# Patient Record
Sex: MALE | HISPANIC OR LATINO | Employment: FULL TIME | URBAN - METROPOLITAN AREA
[De-identification: names, ages, dates, MRNs, and addresses within clinical notes are randomized per-mention and may not be internally consistent; named-entity substitution may affect disease eponyms.]

---

## 2024-04-06 ENCOUNTER — HOSPITAL ENCOUNTER (EMERGENCY)
Facility: CLINIC | Age: 49
Discharge: HOME | End: 2024-04-07
Attending: EMERGENCY MEDICINE | Admitting: EMERGENCY MEDICINE

## 2024-04-06 ENCOUNTER — APPOINTMENT (OUTPATIENT)
Dept: GENERAL RADIOLOGY | Facility: CLINIC | Age: 49
End: 2024-04-06
Attending: EMERGENCY MEDICINE

## 2024-04-06 ENCOUNTER — APPOINTMENT (OUTPATIENT)
Dept: CT IMAGING | Facility: CLINIC | Age: 49
End: 2024-04-06
Attending: EMERGENCY MEDICINE

## 2024-04-06 VITALS
WEIGHT: 210 LBS | TEMPERATURE: 97.1 F | BODY MASS INDEX: 34.99 KG/M2 | OXYGEN SATURATION: 97 % | HEART RATE: 72 BPM | RESPIRATION RATE: 16 BRPM | SYSTOLIC BLOOD PRESSURE: 145 MMHG | HEIGHT: 65 IN | DIASTOLIC BLOOD PRESSURE: 80 MMHG

## 2024-04-06 DIAGNOSIS — T07.XXXA MULTIPLE CONTUSIONS: ICD-10-CM

## 2024-04-06 DIAGNOSIS — I49.1 PAC (PREMATURE ATRIAL CONTRACTION): ICD-10-CM

## 2024-04-06 LAB
ATRIAL RATE - MUSE: 78 BPM
DIASTOLIC BLOOD PRESSURE - MUSE: NORMAL MMHG
INTERPRETATION ECG - MUSE: NORMAL
P AXIS - MUSE: NORMAL DEGREES
PR INTERVAL - MUSE: 152 MS
QRS DURATION - MUSE: 86 MS
QT - MUSE: 376 MS
QTC - MUSE: 428 MS
R AXIS - MUSE: 46 DEGREES
SYSTOLIC BLOOD PRESSURE - MUSE: NORMAL MMHG
T AXIS - MUSE: 8 DEGREES
VENTRICULAR RATE- MUSE: 78 BPM

## 2024-04-06 PROCEDURE — 73080 X-RAY EXAM OF ELBOW: CPT | Mod: LT

## 2024-04-06 PROCEDURE — 73562 X-RAY EXAM OF KNEE 3: CPT | Mod: LT

## 2024-04-06 PROCEDURE — 93005 ELECTROCARDIOGRAM TRACING: CPT

## 2024-04-06 PROCEDURE — 73502 X-RAY EXAM HIP UNI 2-3 VIEWS: CPT

## 2024-04-06 PROCEDURE — 250N000013 HC RX MED GY IP 250 OP 250 PS 637: Performed by: EMERGENCY MEDICINE

## 2024-04-06 PROCEDURE — 72125 CT NECK SPINE W/O DYE: CPT

## 2024-04-06 PROCEDURE — 99285 EMERGENCY DEPT VISIT HI MDM: CPT | Mod: 25

## 2024-04-06 RX ORDER — ACETAMINOPHEN 500 MG
1000 TABLET ORAL ONCE
Status: COMPLETED | OUTPATIENT
Start: 2024-04-06 | End: 2024-04-06

## 2024-04-06 RX ORDER — IBUPROFEN 600 MG/1
600 TABLET, FILM COATED ORAL ONCE
Status: COMPLETED | OUTPATIENT
Start: 2024-04-06 | End: 2024-04-06

## 2024-04-06 RX ADMIN — IBUPROFEN 600 MG: 600 TABLET ORAL at 22:48

## 2024-04-06 RX ADMIN — ACETAMINOPHEN 1000 MG: 500 TABLET ORAL at 22:48

## 2024-04-06 ASSESSMENT — COLUMBIA-SUICIDE SEVERITY RATING SCALE - C-SSRS
2. HAVE YOU ACTUALLY HAD ANY THOUGHTS OF KILLING YOURSELF IN THE PAST MONTH?: NO
6. HAVE YOU EVER DONE ANYTHING, STARTED TO DO ANYTHING, OR PREPARED TO DO ANYTHING TO END YOUR LIFE?: NO
1. IN THE PAST MONTH, HAVE YOU WISHED YOU WERE DEAD OR WISHED YOU COULD GO TO SLEEP AND NOT WAKE UP?: NO

## 2024-04-06 ASSESSMENT — ACTIVITIES OF DAILY LIVING (ADL): ADLS_ACUITY_SCORE: 33

## 2024-04-07 LAB
ANION GAP SERPL CALCULATED.3IONS-SCNC: 13 MMOL/L (ref 7–15)
BASOPHILS # BLD AUTO: 0 10E3/UL (ref 0–0.2)
BASOPHILS NFR BLD AUTO: 0 %
BUN SERPL-MCNC: 17.7 MG/DL (ref 6–20)
CALCIUM SERPL-MCNC: 8.6 MG/DL (ref 8.6–10)
CHLORIDE SERPL-SCNC: 101 MMOL/L (ref 98–107)
CREAT SERPL-MCNC: 0.77 MG/DL (ref 0.67–1.17)
DEPRECATED HCO3 PLAS-SCNC: 21 MMOL/L (ref 22–29)
EGFRCR SERPLBLD CKD-EPI 2021: >90 ML/MIN/1.73M2
EOSINOPHIL # BLD AUTO: 0.6 10E3/UL (ref 0–0.7)
EOSINOPHIL NFR BLD AUTO: 7 %
ERYTHROCYTE [DISTWIDTH] IN BLOOD BY AUTOMATED COUNT: 12.9 % (ref 10–15)
GLUCOSE SERPL-MCNC: 225 MG/DL (ref 70–99)
HCT VFR BLD AUTO: 40.1 % (ref 40–53)
HGB BLD-MCNC: 13.6 G/DL (ref 13.3–17.7)
IMM GRANULOCYTES # BLD: 0 10E3/UL
IMM GRANULOCYTES NFR BLD: 0 %
LYMPHOCYTES # BLD AUTO: 2.1 10E3/UL (ref 0.8–5.3)
LYMPHOCYTES NFR BLD AUTO: 26 %
MAGNESIUM SERPL-MCNC: 1.9 MG/DL (ref 1.7–2.3)
MCH RBC QN AUTO: 28.5 PG (ref 26.5–33)
MCHC RBC AUTO-ENTMCNC: 33.9 G/DL (ref 31.5–36.5)
MCV RBC AUTO: 84 FL (ref 78–100)
MONOCYTES # BLD AUTO: 0.5 10E3/UL (ref 0–1.3)
MONOCYTES NFR BLD AUTO: 6 %
NEUTROPHILS # BLD AUTO: 5.1 10E3/UL (ref 1.6–8.3)
NEUTROPHILS NFR BLD AUTO: 61 %
NRBC # BLD AUTO: 0 10E3/UL
NRBC BLD AUTO-RTO: 0 /100
PLATELET # BLD AUTO: 196 10E3/UL (ref 150–450)
POTASSIUM SERPL-SCNC: 3.7 MMOL/L (ref 3.4–5.3)
RBC # BLD AUTO: 4.77 10E6/UL (ref 4.4–5.9)
SODIUM SERPL-SCNC: 135 MMOL/L (ref 135–145)
TROPONIN T SERPL HS-MCNC: <6 NG/L
WBC # BLD AUTO: 8.4 10E3/UL (ref 4–11)

## 2024-04-07 PROCEDURE — 83735 ASSAY OF MAGNESIUM: CPT | Performed by: EMERGENCY MEDICINE

## 2024-04-07 PROCEDURE — 85025 COMPLETE CBC W/AUTO DIFF WBC: CPT | Performed by: EMERGENCY MEDICINE

## 2024-04-07 PROCEDURE — 36415 COLL VENOUS BLD VENIPUNCTURE: CPT | Performed by: EMERGENCY MEDICINE

## 2024-04-07 PROCEDURE — 84484 ASSAY OF TROPONIN QUANT: CPT | Performed by: EMERGENCY MEDICINE

## 2024-04-07 PROCEDURE — 80048 BASIC METABOLIC PNL TOTAL CA: CPT | Performed by: EMERGENCY MEDICINE

## 2024-04-07 ASSESSMENT — ACTIVITIES OF DAILY LIVING (ADL)
ADLS_ACUITY_SCORE: 35
ADLS_ACUITY_SCORE: 35

## 2024-04-07 NOTE — ED TRIAGE NOTES
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 Pts coworkers pulled him out from underneath. Pt reports pain to L side of back and down L leg. Pt denies cervical tenderness but has pain to L side of neck/shoulder area.. Denies HA or LOC.      Triage Assessment (Adult)       Row Name 04/06/24 2489          Triage Assessment    Airway WDL WDL        Respiratory WDL    Respiratory WDL WDL        Cardiac WDL    Cardiac WDL WDL        Cognitive/Neuro/Behavioral WDL    Cognitive/Neuro/Behavioral WDL WDL

## 2024-04-07 NOTE — ED PROVIDER NOTES
"  History     Chief Complaint:  Trauma       The history is provided by the patient. The history is limited by a language barrier. A  was used (Serbian).      Jose Alfonso is a 48 year old male with history of diabetes mellitus who presents after a trauma at work. He reports about 1 hour 20 minutes ago he was at work and an item of furniture holding boxes of chilis, water, and other heavy drinks fell onto his left side. He states he put his arms up, and his left forearm got the majority of the impact. He was pinned under the furniture until his coworkers helped him out. Reports left-sided low back pain radiating down to his left knee. Reports left sided neck pain. Notes an allergy to penicillin.    Independent Historian:   None - Patient Only    Review of External Notes:   [facility] ED note 1/19/24 for nausea    Medications:    Lipitor  Metformin   Diabeta  Jardiance    Past Medical History:    Type 2 diabetes mellitus   Hyperlipidemia     Surgical history:  Spin surgery     Physical Exam   Patient Vitals for the past 24 hrs:   BP Temp Temp src Pulse Resp SpO2 Height Weight   04/06/24 2250 (!) 145/80 -- -- 72 -- 97 % -- --   04/06/24 2202 (!) 155/74 97.1  F (36.2  C) Temporal 67 16 98 % 1.65 m (5' 4.96\") 95.3 kg (210 lb)        Physical Exam  General: Appears well-developed and well-nourished.   Head: No signs of trauma.   Neck: Mild tenderness.  CV: Normal rate  Resp: Effort normal and breath sounds normal. No respiratory distress.   GI: Soft. There is no tenderness.  No rebound or guarding.  Normal bowel sounds.    MSK: Normal range of motion. Mild left knee tenderness and posterior left hip tenderness.  Neuro: The patient is alert and oriented.  Speech normal.  Skin: Skin is warm and dry. No rash noted.   Psych: normal mood and affect. behavior is normal.       Emergency Department Course   ECG:  ECG results from 04/06/24   EKG 12 lead     Value    Systolic Blood Pressure     Diastolic " Blood Pressure     Ventricular Rate 78    Atrial Rate 78    PA Interval 152    QRS Duration 86        QTc 428    P Axis     R AXIS 46    T Axis 8    Interpretation ECG      Sinus rhythm with Premature atrial complexes in a pattern of bigeminy  Otherwise normal ECG  No previous ECGs available  Read by me         Imaging:  CT Cervical Spine w/o Contrast   Final Result   IMPRESSION:   1.  No fracture or posttraumatic subluxation.   2.  No high-grade spinal canal or neural foraminal stenosis.      XR Pelvis and Hip Left 1 View   Final Result   IMPRESSION: Normal joint spaces and alignment. No fracture.      XR Knee Left 3 Views   Final Result   IMPRESSION: No fracture. Small effusion. Severe patellofemoral spurring. Mild spurring of the tibial spines.      Elbow  XR, G/E 3 views, left   Final Result   IMPRESSION: Normal joint spaces and alignment. No fracture or joint effusion.         Emergency Department Course & Assessments:       Interventions:  Medications   acetaminophen (TYLENOL) tablet 1,000 mg (1,000 mg Oral $Given 4/6/24 2248)   ibuprofen (ADVIL/MOTRIN) tablet 600 mg (600 mg Oral $Given 4/6/24 2248)        Independent Interpretation (X-rays, CTs, rhythm strip):  On my independent interpretation of knee XR without displaced fracture    Assessments/Consultations/Discussion of Management or Tests:  ED Course as of 04/07/24 0625   Sat Apr 06, 2024   2229 I obtained the history and examined the patient as noted above.    2344 I rechecked and updated the patient regarding imaging results.   Sun Apr 07, 2024   0107 I rechecked and updated the patient. They were amenable to plan for discharge.        Social Determinants of Health affecting care:   None    Disposition:  The patient was discharged to home.     Impression & Plan    Medical Decision Making:  Jose Alfonso presents after an injury at work.  He reports that some heavy items on a shelf had fallen on top of him.  He states initially he felt okay  but then developed some increasing pain and came to the hospital.  My evaluation, there is no clear external signs of trauma.  I did obtain imaging of the affected areas and this was reassuring and the patient was able to ambulate without difficulty.  It was noted that he had somewhat variable heartbeats so an EKG was obtained that did show PACs.  Given this I did obtain blood work which was reassuring.  I believe this is an incidental finding and I did discuss this with the patient.  At this point I felt is appropriate for discharge home and recommend supportive care I did recommend following up with primary care doctor both regarding the workplace injury and the PACs.    Diagnosis:    ICD-10-CM    1. Multiple contusions  T07.XXXA       2. PAC (premature atrial contraction)  I49.1            Discharge Medications:  There are no discharge medications for this patient.     Scribe Disclosure:  I, Yaz Donnelly, am serving as a scribe at 10:27 PM on 4/6/2024 to document services personally performed by Douglas Cardeans MD based on my observations and the provider's statements to me.     4/6/2024   Douglas Cardenas MD [place]al, John A, MD  04/07/24 0632

## 2024-04-07 NOTE — ED NOTES
Writer spoke with Dr. Cardenas regarding Pt and verbal order to place c-collar and no trauma activation at this time.     C-collar placed by RN and tech.

## 2025-07-04 ENCOUNTER — HOSPITAL ENCOUNTER (EMERGENCY)
Facility: CLINIC | Age: 50
Discharge: HOME | End: 2025-07-04
Attending: EMERGENCY MEDICINE | Admitting: EMERGENCY MEDICINE

## 2025-07-04 VITALS
HEIGHT: 68 IN | SYSTOLIC BLOOD PRESSURE: 127 MMHG | DIASTOLIC BLOOD PRESSURE: 75 MMHG | HEART RATE: 81 BPM | OXYGEN SATURATION: 100 % | RESPIRATION RATE: 18 BRPM | BODY MASS INDEX: 31.83 KG/M2 | WEIGHT: 210 LBS | TEMPERATURE: 98.9 F

## 2025-07-04 DIAGNOSIS — T78.40XA ALLERGIC REACTION, INITIAL ENCOUNTER: ICD-10-CM

## 2025-07-04 DIAGNOSIS — K13.79 UVULAR EDEMA: ICD-10-CM

## 2025-07-04 PROCEDURE — 86160 COMPLEMENT ANTIGEN: CPT | Performed by: EMERGENCY MEDICINE

## 2025-07-04 PROCEDURE — 250N000009 HC RX 250

## 2025-07-04 PROCEDURE — 96374 THER/PROPH/DIAG INJ IV PUSH: CPT

## 2025-07-04 PROCEDURE — 36415 COLL VENOUS BLD VENIPUNCTURE: CPT | Performed by: EMERGENCY MEDICINE

## 2025-07-04 PROCEDURE — 96375 TX/PRO/DX INJ NEW DRUG ADDON: CPT

## 2025-07-04 PROCEDURE — 86161 COMPLEMENT/FUNCTION ACTIVITY: CPT | Performed by: EMERGENCY MEDICINE

## 2025-07-04 PROCEDURE — 250N000009 HC RX 250: Performed by: EMERGENCY MEDICINE

## 2025-07-04 PROCEDURE — 250N000011 HC RX IP 250 OP 636: Mod: JZ | Performed by: EMERGENCY MEDICINE

## 2025-07-04 PROCEDURE — 99285 EMERGENCY DEPT VISIT HI MDM: CPT | Mod: 25

## 2025-07-04 RX ORDER — PREDNISONE 20 MG/1
TABLET ORAL
Qty: 10 TABLET | Refills: 0 | Status: SHIPPED | OUTPATIENT
Start: 2025-07-04

## 2025-07-04 RX ORDER — EPINEPHRINE 0.3 MG/.3ML
0.3 INJECTION SUBCUTANEOUS
Qty: 2 EACH | Refills: 0 | Status: SHIPPED | OUTPATIENT
Start: 2025-07-04

## 2025-07-04 RX ORDER — FAMOTIDINE 20 MG/1
20 TABLET, FILM COATED ORAL 2 TIMES DAILY
Qty: 5 TABLET | Refills: 0 | Status: SHIPPED | OUTPATIENT
Start: 2025-07-04

## 2025-07-04 RX ORDER — IPRATROPIUM BROMIDE AND ALBUTEROL SULFATE 2.5; .5 MG/3ML; MG/3ML
3 SOLUTION RESPIRATORY (INHALATION) ONCE
Status: COMPLETED | OUTPATIENT
Start: 2025-07-04 | End: 2025-07-04

## 2025-07-04 RX ORDER — METHYLPREDNISOLONE SODIUM SUCCINATE 125 MG/2ML
125 INJECTION INTRAMUSCULAR; INTRAVENOUS ONCE
Status: COMPLETED | OUTPATIENT
Start: 2025-07-04 | End: 2025-07-04

## 2025-07-04 RX ORDER — LIDOCAINE 40 MG/G
CREAM TOPICAL
Status: DISCONTINUED | OUTPATIENT
Start: 2025-07-04 | End: 2025-07-04 | Stop reason: HOSPADM

## 2025-07-04 RX ORDER — DIPHENHYDRAMINE HYDROCHLORIDE 50 MG/ML
50 INJECTION, SOLUTION INTRAMUSCULAR; INTRAVENOUS ONCE
Status: COMPLETED | OUTPATIENT
Start: 2025-07-04 | End: 2025-07-04

## 2025-07-04 RX ADMIN — DIPHENHYDRAMINE HYDROCHLORIDE 50 MG: 50 INJECTION, SOLUTION INTRAMUSCULAR; INTRAVENOUS at 18:15

## 2025-07-04 RX ADMIN — FAMOTIDINE 20 MG: 10 INJECTION, SOLUTION INTRAVENOUS at 18:14

## 2025-07-04 RX ADMIN — METHYLPREDNISOLONE SODIUM SUCCINATE 125 MG: 125 INJECTION, POWDER, FOR SOLUTION INTRAMUSCULAR; INTRAVENOUS at 18:16

## 2025-07-04 RX ADMIN — IPRATROPIUM BROMIDE AND ALBUTEROL SULFATE 3 ML: .5; 3 SOLUTION RESPIRATORY (INHALATION) at 18:12

## 2025-07-04 RX ADMIN — EPINEPHRINE 0.5 MG: 1 INJECTION INTRAMUSCULAR; INTRAVENOUS; SUBCUTANEOUS at 18:05

## 2025-07-04 ASSESSMENT — ACTIVITIES OF DAILY LIVING (ADL)
ADLS_ACUITY_SCORE: 41

## 2025-07-04 NOTE — ED TRIAGE NOTES
Using an  pt reports he was eating a little while ago, and started coughing, sneezing, feels like throat is closing and eyes are swelling.   Pt sounds as though his tongue is big and having to take pauses while talking and take big breath.

## 2025-07-04 NOTE — ED PROVIDER NOTES
Emergency Department Note      History of Present Illness     Chief Complaint   Allergic Reaction      HPI   Jose Alfonso is a 49 year old male with a history of type II diabetes who presents with a female  to the Emergency Department for an allergic reaction. The patient reports he was eating chilli with mole and beef at 1630 when he started sneezing, coughing, having shortness of breath, his eyes began watering, he felt like his speech was muffled, and his lips became swollen. He previously had these symptoms when making a spice mix which was helped with an albuterol inhaler. Denies recently feeling sick or using any at home medications at the time of symptom onset. The preceding history was taken via a Slovenian interpretor.     Independent Historian   None    Review of External Notes   The patient does not have any records relevant to their current presentation.     Past Medical History     Medical History and Problem List   Cellulitis  H. pylori infection   Radiculopathy, lumbar region   Type II diabetes     Medications   Atorvastatin   glyBURIDE   metFORMIN XR     Surgical History   L4-5 partial discectomy     Physical Exam     No data found.    Physical Exam  GENERAL: well developed, pleasant, congested sounding voice  HEAD: atraumatic  EYES: pupils reactive, extraocular muscles intact, conjunctivae normal  ENT:  mucus membranes moist, uvular edema  NECK:  trachea midline, normal range of motion  RESPIRATORY: no tachypnea, breath sounds clear to auscultation   CVS: normal S1/S2, no murmurs, intact distal pulses  ABDOMEN: soft, nontender, nondistention  MUSCULOSKELETAL: no deformities  SKIN: warm and dry, no acute rashes or ulceration  NEURO: GCS 15, cranial nerves intact, alert and oriented x3  PSYCH:  Mood/affect normal    Diagnostics     Lab Results   Labs Ordered and Resulted from Time of ED Arrival to Time of ED Departure - No data to display    Imaging   No orders to display       EKG    None    Independent Interpretation   None    ED Course      Medications Administered   Medications   diphenhydrAMINE (BENADRYL) injection 50 mg (50 mg Intravenous $Given 7/4/25 1815)   famotidine (PEPCID) injection 20 mg (20 mg Intravenous $Given 7/4/25 1814)   methylPREDNISolone Na Suc (solu-MEDROL) injection 125 mg (125 mg Intravenous $Given 7/4/25 1816)   ipratropium - albuterol 0.5 mg/2.5 mg (3mg)/3 mL (DUONEB) neb solution 3 mL (3 mLs Nebulization $Given 7/4/25 1812)   EPINEPHrine (ADRENALIN) kit 0.3-0.5 mg (0.5 mg Intramuscular $Given 7/4/25 1805)       Procedures   None     Discussion of Management   None    ED Course   ED Course as of 07/09/25 2155 Fri Jul 04, 2025 1807 I evaluated the patient, obtained history, and performed a physical exam as detailed above.    1913 I rechecked the patient and updated them on the plan of care.        Additional Documentation  None    Medical Decision Making / Diagnosis     CMS Diagnoses: None    MIPS   None    [facility]   Jose Alfonso is a 49 year old male presents with sneezing and watering of eyes with swelling in setting of eating beef chili.  He has some mild uvular edema but no systemic findings.  He was given epi, solumedrol, benadryl and pepcid.  He feels improved.  Considered anaphylaxis, barotrauma from sneezing so hard, hereditary  angioedema, no family history and labs sent off.  He feels improved and safe to discharge.  Discussed indications to return and follow up.    Disposition   The patient was discharged.     Diagnosis     ICD-10-CM    1. Allergic reaction, initial encounter  T78.40XA       2. Uvular edema  K13.79            Discharge Medications   Discharge Medication List as of 7/4/2025  8:31 PM        START taking these medications    Details   EPINEPHrine (ANY BX GENERIC EQUIV) 0.3 MG/0.3ML injection 2-pack Inject 0.3 mLs (0.3 mg) into the muscle once as needed for anaphylaxis. May repeat one time in 5-15 minutes if response to initial dose is  inadequate., Disp-2 each, R-0, E-Prescribe      famotidine (PEPCID) 20 MG tablet Take 1 tablet (20 mg) by mouth 2 times daily., Disp-5 tablet, R-0, E-Prescribe      predniSONE (DELTASONE) 20 MG tablet Take two tablets (= 40mg) each day for 5 (five) days, Disp-10 tablet, R-0, E-Prescribe           Scribe Disclosure:  I, Abena Longoria, am serving as a scribe at 6:07 PM on 7/4/2025 to document services personally performed by José Dunn MD based on my observations and the provider's statements to me.        José Dunn MD  07/09/25 1328

## 2025-07-06 LAB — C1INH SERPL-MCNC: 24 MG/DL

## 2025-07-07 LAB
C1INH ACT/NOR SERPL: 87 %
C4 SERPL-MCNC: 17 MG/DL (ref 13–39)